# Patient Record
Sex: MALE | Race: WHITE | URBAN - METROPOLITAN AREA
[De-identification: names, ages, dates, MRNs, and addresses within clinical notes are randomized per-mention and may not be internally consistent; named-entity substitution may affect disease eponyms.]

---

## 2020-10-29 ENCOUNTER — OFFICE VISIT (OUTPATIENT)
Dept: FAMILY MEDICINE | Facility: CLINIC | Age: 25
End: 2020-10-29
Payer: COMMERCIAL

## 2020-10-29 ENCOUNTER — ANCILLARY PROCEDURE (OUTPATIENT)
Dept: GENERAL RADIOLOGY | Facility: CLINIC | Age: 25
End: 2020-10-29
Attending: FAMILY MEDICINE
Payer: COMMERCIAL

## 2020-10-29 VITALS
TEMPERATURE: 96.3 F | WEIGHT: 170 LBS | OXYGEN SATURATION: 97 % | HEIGHT: 73 IN | HEART RATE: 72 BPM | RESPIRATION RATE: 16 BRPM | DIASTOLIC BLOOD PRESSURE: 77 MMHG | SYSTOLIC BLOOD PRESSURE: 125 MMHG | BODY MASS INDEX: 22.53 KG/M2

## 2020-10-29 DIAGNOSIS — R06.09 DYSPNEA ON EXERTION: Primary | ICD-10-CM

## 2020-10-29 DIAGNOSIS — J45.990 EXERCISE-INDUCED ASTHMA: ICD-10-CM

## 2020-10-29 DIAGNOSIS — R06.09 DYSPNEA ON EXERTION: ICD-10-CM

## 2020-10-29 LAB
ERYTHROCYTE [DISTWIDTH] IN BLOOD BY AUTOMATED COUNT: 11.7 % (ref 10–15)
HCT VFR BLD AUTO: 42.1 % (ref 40–53)
HGB BLD-MCNC: 14.4 G/DL (ref 13.3–17.7)
MCH RBC QN AUTO: 30.1 PG (ref 26.5–33)
MCHC RBC AUTO-ENTMCNC: 34.2 G/DL (ref 31.5–36.5)
MCV RBC AUTO: 88 FL (ref 78–100)
PLATELET # BLD AUTO: 226 10E9/L (ref 150–450)
RBC # BLD AUTO: 4.78 10E12/L (ref 4.4–5.9)
WBC # BLD AUTO: 6.1 10E9/L (ref 4–11)

## 2020-10-29 PROCEDURE — 99203 OFFICE O/P NEW LOW 30 MIN: CPT | Performed by: FAMILY MEDICINE

## 2020-10-29 PROCEDURE — 85027 COMPLETE CBC AUTOMATED: CPT | Performed by: FAMILY MEDICINE

## 2020-10-29 PROCEDURE — 71046 X-RAY EXAM CHEST 2 VIEWS: CPT | Mod: FY | Performed by: RADIOLOGY

## 2020-10-29 RX ORDER — ALBUTEROL SULFATE 90 UG/1
2 AEROSOL, METERED RESPIRATORY (INHALATION) EVERY 6 HOURS PRN
Qty: 8 G | Refills: 4 | Status: SHIPPED | OUTPATIENT
Start: 2020-10-29

## 2020-10-29 ASSESSMENT — MIFFLIN-ST. JEOR: SCORE: 1814.99

## 2020-10-29 NOTE — LETTER
My Asthma Action Plan    Name: Hector Bentley   YOB: 1995  Date: 10/29/2020   My doctor: Kimberly Melendrez MD   My clinic: [facility]        My Rescue Medicine:   Albuterol inhaler (Proair/Ventolin/Proventil HFA)  2-4 puffs EVERY 4 HOURS as needed. Use a spacer if recommended by your provider.   My Asthma Severity:   Intermittent / Exercise Induced  Know your asthma triggers: cold air  exercise or sports          GREEN ZONE   Good Control    I feel good    No cough or wheeze    Can work, sleep and play without asthma symptoms       Take your asthma control medicine every day.     1. If exercise triggers your asthma, take your rescue medication    15 minutes before exercise or sports, and    During exercise if you have asthma symptoms  2. Spacer to use with inhaler: If you have a spacer, make sure to use it with your inhaler             YELLOW ZONE Getting Worse  I have ANY of these:    I do not feel good    Cough or wheeze    Chest feels tight    Wake up at night   1. Keep taking your Green Zone medications  2. Start taking your rescue medicine:    every 20 minutes for up to 1 hour. Then every 4 hours for 24-48 hours.  3. If you stay in the Yellow Zone for more than 12-24 hours, contact your doctor.  4. If you do not return to the Green Zone in 12-24 hours or you get worse, start taking your oral steroid medicine if prescribed by your provider.           RED ZONE Medical Alert - Get Help  I have ANY of these:    I feel awful    Medicine is not helping    Breathing getting harder    Trouble walking or talking    Nose opens wide to breathe       1. Take your rescue medicine NOW  2. If your provider has prescribed an oral steroid medicine, start taking it NOW  3. Call your doctor NOW  4. If you are still in the Red Zone after 20 minutes and you have not reached your doctor:    Take your rescue medicine again and    Call 911 or go to the emergency room right away    See your regular  doctor within 2 weeks of an Emergency Room or Urgent Care visit for follow-up treatment.          Annual Reminders:  Meet with Asthma Educator,  Flu Shot in the Fall, consider Pneumonia Vaccination for patients with asthma (aged 19 and older).    Pharmacy: [id number] - [place] [id number] [facility] AT [facility] [address]    Electronically signed by Kimberly Melendrez MD   Date: 10/29/20                    Asthma Triggers  How To Control Things That Make Your Asthma Worse    Triggers are things that make your asthma worse.  Look at the list below to help you find your triggers and   what you can do about them. You can help prevent asthma flare-ups by staying away from your triggers.      Trigger                                                          What you can do   Cigarette Smoke  Tobacco smoke can make asthma worse. Do not allow smoking in your home, car or around you.  Be sure no one smokes at a child s day care or school.  If you smoke, ask your health care provider for ways to help you quit.  Ask family members to quit too.  Ask your health care provider for a referral to Quit Plan to help you quit smoking, or call 5-716-127-PLAN.     Colds, Flu, Bronchitis  These are common triggers of asthma. Wash your hands often.  Don t touch your eyes, nose or mouth.  Get a flu shot every year.     Dust Mites  These are tiny bugs that live in cloth or carpet. They are too small to see. Wash sheets and blankets in hot water every week.   Encase pillows and mattress in dust mite proof covers.  Avoid having carpet if you can. If you have carpet, vacuum weekly.   Use a dust mask and HEPA vacuum.   Pollen and Outdoor Mold  Some people are allergic to trees, grass, or weed pollen, or molds. Try to keep your windows closed.  Limit time out doors when pollen count is high.   Ask you health care provider about taking medicine during allergy season.     Animal Dander  Some people are allergic to skin flakes,  urine or saliva from pets with fur or feathers. Keep pets with fur or feathers out of your home.    If you can t keep the pet outdoors, then keep the pet out of your bedroom.  Keep the bedroom door closed.  Keep pets off cloth furniture and away from stuffed toys.     Mice, Rats, and Cockroaches  Some people are allergic to the waste from these pests.   Cover food and garbage.  Clean up spills and food crumbs.  Store grease in the refrigerator.   Keep food out of the bedroom.   Indoor Mold  This can be a trigger if your home has high moisture. Fix leaking faucets, pipes, or other sources of water.   Clean moldy surfaces.  Dehumidify basement if it is damp and smelly.   Smoke, Strong Odors, and Sprays  These can reduce air quality. Stay away from strong odors and sprays, such as perfume, powder, hair spray, paints, smoke incense, paint, cleaning products, candles and new carpet.   Exercise or Sports  Some people with asthma have this trigger. Be active!  Ask your doctor about taking medicine before sports or exercise to prevent symptoms.    Warm up for 5-10 minutes before and after sports or exercise.     Other Triggers of Asthma  Cold air:  Cover your nose and mouth with a scarf.  Sometimes laughing or crying can be a trigger.  Some medicines and food can trigger asthma.

## 2020-10-29 NOTE — PROGRESS NOTES
Subjective     Hector Bentley is a 24 year old male who presents to clinic today for the following health issues:    HPI         Asthma Follow-Up    Was ACT completed today?  Yes    ACT Total Scores 10/29/2020   ACT TOTAL SCORE (Goal Greater than or Equal to 20) 21   In the past 12 months, how many times did you visit the emergency room for your asthma without being admitted to the hospital? 0   In the past 12 months, how many times were you hospitalized overnight because of your asthma? 0          How many days per week do you miss taking your asthma controller medication?  I do not have an asthma controller medication    Please describe any recent triggers for your asthma: exercise or sports    Have you had any Emergency Room Visits, Urgent Care Visits, or Hospital Admissions since your last office visit?  No      How many servings of fruits and vegetables do you eat daily?  2-3    On average, how many sweetened beverages do you drink each day (Examples: soda, juice, sweet tea, etc.  Do NOT count diet or artificially sweetened beverages)?   0    How many days per week do you exercise enough to make your heart beat faster? 3 or less    How many minutes a day do you exercise enough to make your heart beat faster? 60 or more    How many days per week do you miss taking your medication? 0  Plays hockey and tennis, bike and running. Reports having shortness of breath and feeling short winded after physical exertion. Symptoms started after graduating high school. Patient feels short winded a lot faster than his teammates.     He denies any wheezing. Denies palpitations. Denies any environmental allergies. Cold air combined with physical activity triggers the shortness of breath.   He has not tried any OTC medications. Has never been tested for asthma.       New Patient/Transfer of Care    Review of Systems   Constitutional, HEENT, cardiovascular, pulmonary, gi and gu systems are negative, except as otherwise noted.     "  Objective    /77   Pulse 72   Temp 96.3  F (35.7  C) (Tympanic)   Resp 16   Ht 1.854 m (6' 1\")   Wt 77.1 kg (170 lb)   SpO2 97%   BMI 22.43 kg/m    Body mass index is 22.43 kg/m .  Physical Exam   GENERAL: healthy, alert and no distress  EYES: Eyes grossly normal to inspection, PERRL and conjunctivae and sclerae normal  HENT: ear canals and TM's normal, nose and mouth without ulcers or lesions  NECK: no adenopathy, no asymmetry, masses, or scars and thyroid normal to palpation  RESP: lungs clear to auscultation - no rales, rhonchi or wheezes  CV: regular rate and rhythm, normal S1 S2, no S3 or S4, no murmur, click or rub, no peripheral edema and peripheral pulses strong  MS: no gross musculoskeletal defects noted, no edema  PSYCH: mentation appears normal, affect normal/bright    Results for orders placed or performed in visit on 10/29/20   XR Chest 2 Views     Status: None    Narrative    CHEST TWO VIEWS 10/29/2020 8:08 AM     HISTORY: Dyspnea on exertion.    COMPARISON: None.       Impression    IMPRESSION:  There are no acute infiltrates. The cardiac silhouette is  not enlarged. Pulmonary vasculature is unremarkable.     SG MILLER MD   Results for orders placed or performed in visit on 10/29/20   CBC with platelets     Status: None   Result Value Ref Range    WBC 6.1 4.0 - 11.0 10e9/L    RBC Count 4.78 4.4 - 5.9 10e12/L    Hemoglobin 14.4 13.3 - 17.7 g/dL    Hematocrit 42.1 40.0 - 53.0 %    MCV 88 78 - 100 fl    MCH 30.1 26.5 - 33.0 pg    MCHC 34.2 31.5 - 36.5 g/dL    RDW 11.7 10.0 - 15.0 %    Platelet Count 226 150 - 450 10e9/L           Assessment & Plan     Dyspnea on exertion  Assessment: 24-year-old with no significant past medical history who presents to the clinic for dyspnea on exertion especially when he exercises in cold air.  Patient reports that symptoms have been ongoing since graduating high school.  He has never been evaluated for asthma.  Due to the pandemic we will not be able " to do any spirometry testing today.  Other differentials includes scoliosis and anemia.  Chest x-ray and CBC were ordered today.  He was given a trial of an albuterol inhaler.  Advised to return to clinic for a inhaled corticosteroid if symptoms fails to improve with the use of albuterol inhaler.  Patient verbalized understanding.  He declined immunizations today.  Plan:  - XR Chest 2 Views; Future  - CBC with platelets    Exercise-induced asthma  - albuterol (PROAIR HFA/PROVENTIL HFA/VENTOLIN HFA) 108 (90 Base) MCG/ACT inhaler; Inhale 2 puffs into the lungs every 6 hours as needed for shortness of breath / dyspnea or wheezing        Return in about 2 weeks (around 11/12/2020) for Follow-up visit-  if symptoms fails to improve.  -Patient needs to establish care with PCP.      Kimberly Melendrez MD  [facility]

## 2020-10-29 NOTE — PATIENT INSTRUCTIONS
Patient Education     Proair HFA Inhaler  Medicine: Albuterol (al-BYOO-ter-ahl)  What it does  Works quickly to relax your airways and make breathing easier. The medicine usually lasts 3 to 4 hours. Use when you need fast relief.  Test spray (priming)  Prime inhaler before first use or if not used for 2 weeks or more. Shake the inhaler and spray 3 times away from face.  How to use this inhaler  1. Wash and dry your hands well.  2. Remove the mouthpiece cover. Check for loose parts inside the mouthpiece.  3. Sit up straight or stand up.  4. Shake inhaler well before each spray.  5. Fully exhale (breathe out) through mouth. Hold the inhaler so the mouthpiece is at the bottom and the canister is sticking straight up.  6. Place the mouthpiece between your lips. Make sure that your tongue is flat under the mouthpiece and does not block the opening.  7. Seal your lips around the mouthpiece.  8. Push the canister all the way down as you slowly take a deep breath through your mouth for 5 seconds.  9. Hold your breath for 10 seconds. If you cannot hold your breath for 10 seconds, then hold it for as long as you can.  10. If you need another dose, wait one minute and repeat steps 4 to 9.  11. Replace the cover after each use. Store in a cool, dry place.  Cleaning  Wash one time each week. Remove canister and wash mouthpiece with warm water. Do not get the canister wet. Let air-dry overnight. Put inhaler back together and spray two times.  If not cleaned, the inhaler may not work.  How to tell if the inhaler is empty  Each inhaler contains 200 puffs. The inhaler is empty when the dose counter reads 0. The numbers will turn red when you have 20 doses left to remind you to get a refill.  If you have questions about the use of your inhaler, please ask your pharmacist or provider.  For more information and video demos, go to [email or web address].  For informational purposes only. Not to replace the advice of your health care  provider.  Copyright   2013 [employer] [facility]. All rights reserved. [employer] [id number] - REV 02/16.  For informational purposes only. Not to replace the advice of your health care provider.  Copyright   2018 [employer]. All rights reserved.           Patient Education     Bronchospasm (Adult)    Bronchospasm occurs when the airways (bronchial tubes) go into spasm and contract. This makes it hard to breathe and causes wheezing (a high-pitched whistling sound). Bronchospasm can also cause frequent coughing without wheezing.  Bronchospasm is due to irritation, inflammation, or allergic reaction of the airways. People with asthma get bronchospasm. However, not everyone with bronchospasm has asthma.  Being exposed to harmful fumes, a recent case of bronchitis, exercise, or a flare-up of chronic obstructive pulmonary disease (COPD) may cause the airways to spasm. An episode of bronchospasm may last 7 to 14 days. Medicine may be prescribed to relax the airways and prevent wheezing. Antibiotics will be prescribed only if your healthcare provider thinks there is a bacterial infection. Antibiotics do not help a viral infection.  Home care    Drink lots of water or other fluids (at least 10 glasses a day) during an attack. This will loosen lung secretions and make it easier to breathe. If you have heart or kidney disease, check with your doctor before you drink extra fluids.    Take prescribed medicine exactly at the times advised. If you take an inhaled medicine to help with breathing, don't use it more than once every 4 hours, unless told to do so. If prescribed an antibiotic or prednisone, take all of the medicine, even if you are feeling better after a few days.    Don't smoke. Also avoid being exposed to secondhand smoke.    If you were given an inhaler, use it exactly as directed. If you need to use it more often than prescribed, your condition may be getting worse. Contact your healthcare  provider.  Follow-up care  Follow up with your healthcare provider, or as advised.  If you are age 65 or older, have a chronic lung disease or condition that affects your immune system, or you smoke, ask your healthcare provider about getting a pneumococcal vaccine, as well as a yearly flu shot (influenza vaccine).  When to seek medical advice  Call your healthcare provider right away if any of these occur:    You need to use your inhalers more often than usual    Fever of 100.4 F (38 C) or higher, or as directed by your healthcare provider    Cough that brings up lots of dark-colored sputum (mucus)    You don't get better within 24 hours  Call 911  Call 911 if any of these occur:    Coughing up bloody sputum (mucus)    Chest pain with each breath    Increased wheezing or shortness of breath  [employer] last reviewed this educational content on 6/1/2018 2000-2020 [employer]. [address] [place] [id number]. All rights reserved. This information is not intended as a substitute for professional medical care. Always follow your healthcare professional's instructions.

## 2020-10-30 ASSESSMENT — ASTHMA QUESTIONNAIRES: ACT_TOTALSCORE: 21

## 2021-01-15 ENCOUNTER — HEALTH MAINTENANCE LETTER (OUTPATIENT)
Age: 26
End: 2021-01-15

## 2021-02-22 ENCOUNTER — OFFICE VISIT (OUTPATIENT)
Dept: FAMILY MEDICINE | Facility: CLINIC | Age: 26
End: 2021-02-22
Payer: COMMERCIAL

## 2021-02-22 VITALS
SYSTOLIC BLOOD PRESSURE: 123 MMHG | RESPIRATION RATE: 16 BRPM | WEIGHT: 169.4 LBS | TEMPERATURE: 98.7 F | OXYGEN SATURATION: 98 % | DIASTOLIC BLOOD PRESSURE: 83 MMHG | BODY MASS INDEX: 22.94 KG/M2 | HEIGHT: 72 IN | HEART RATE: 58 BPM

## 2021-02-22 DIAGNOSIS — R20.9 COLD EXTREMITIES: Primary | ICD-10-CM

## 2021-02-22 LAB
BASOPHILS # BLD AUTO: 0 10E9/L (ref 0–0.2)
BASOPHILS NFR BLD AUTO: 0.2 %
DIFFERENTIAL METHOD BLD: NORMAL
EOSINOPHIL # BLD AUTO: 0.1 10E9/L (ref 0–0.7)
EOSINOPHIL NFR BLD AUTO: 1.4 %
ERYTHROCYTE [DISTWIDTH] IN BLOOD BY AUTOMATED COUNT: 12.7 % (ref 10–15)
HCT VFR BLD AUTO: 45.2 % (ref 40–53)
HGB BLD-MCNC: 15.6 G/DL (ref 13.3–17.7)
LYMPHOCYTES # BLD AUTO: 1.4 10E9/L (ref 0.8–5.3)
LYMPHOCYTES NFR BLD AUTO: 33.1 %
MCH RBC QN AUTO: 30.5 PG (ref 26.5–33)
MCHC RBC AUTO-ENTMCNC: 34.5 G/DL (ref 31.5–36.5)
MCV RBC AUTO: 88 FL (ref 78–100)
MONOCYTES # BLD AUTO: 0.3 10E9/L (ref 0–1.3)
MONOCYTES NFR BLD AUTO: 8.1 %
NEUTROPHILS # BLD AUTO: 2.4 10E9/L (ref 1.6–8.3)
NEUTROPHILS NFR BLD AUTO: 57.2 %
PLATELET # BLD AUTO: 195 10E9/L (ref 150–450)
RBC # BLD AUTO: 5.12 10E12/L (ref 4.4–5.9)
WBC # BLD AUTO: 4.2 10E9/L (ref 4–11)

## 2021-02-22 PROCEDURE — 99213 OFFICE O/P EST LOW 20 MIN: CPT | Performed by: PHYSICIAN ASSISTANT

## 2021-02-22 PROCEDURE — 36415 COLL VENOUS BLD VENIPUNCTURE: CPT | Performed by: PHYSICIAN ASSISTANT

## 2021-02-22 PROCEDURE — 84443 ASSAY THYROID STIM HORMONE: CPT | Performed by: PHYSICIAN ASSISTANT

## 2021-02-22 PROCEDURE — 85025 COMPLETE CBC W/AUTO DIFF WBC: CPT | Performed by: PHYSICIAN ASSISTANT

## 2021-02-22 PROCEDURE — 85379 FIBRIN DEGRADATION QUANT: CPT | Performed by: PHYSICIAN ASSISTANT

## 2021-02-22 ASSESSMENT — MIFFLIN-ST. JEOR: SCORE: 1791.39

## 2021-02-22 NOTE — PROGRESS NOTES
Assessment & Plan     Cold extremities  Labs look ok.  This appear to be chilblains and encouraged to keep area warm and if symptoms persist or worsen, may look to vascular referral  - CBC with platelets differential  - D dimer, quantitative  - TSH with free T4 reflex                 Return in about 2 weeks (around 3/8/2021) for If symptoms persist or worsen.    LAZARUS Tello [facility]    Venecia Young is a 25 year old who presents for the following health issues     HPI       Patient states that he played ice hockey last Saturday (9 days ago) and since then his toes on both feet feel cold and wet. Hector is unsure if he may have had some frostbite or not.  No COVID symptoms        Review of Systems   Constitutional, HEENT, cardiovascular, pulmonary, gi and gu systems are negative, except as otherwise noted.      Objective    /83   Pulse 58   Temp 98.7  F (37.1  C) (Tympanic)   Resp 16   Ht 1.829 m (6')   Wt 76.8 kg (169 lb 6.4 oz)   SpO2 98%   BMI 22.97 kg/m    Body mass index is 22.97 kg/m .  Physical Exam   GENERAL: alert and no distress  EYES: Eyes grossly normal to inspection  RESP: lungs clear to auscultation - no rales, rhonchi or wheezes  CV: regular rate and rhythm, normal S1 S2, no S3 or S4, no murmur, click or rub, no peripheral edema and peripheral pulses strong  MS: skin blanches and returns briskly in feet and toes, normal sensation.  Pedal pulses are palpable and normal    Results for orders placed or performed in visit on 02/22/21   CBC with platelets differential     Status: None   Result Value Ref Range    WBC 4.2 4.0 - 11.0 10e9/L    RBC Count 5.12 4.4 - 5.9 10e12/L    Hemoglobin 15.6 13.3 - 17.7 g/dL    Hematocrit 45.2 40.0 - 53.0 %    MCV 88 78 - 100 fl    MCH 30.5 26.5 - 33.0 pg    MCHC 34.5 31.5 - 36.5 g/dL    RDW 12.7 10.0 - 15.0 %    Platelet Count 195 150 - 450 10e9/L    % Neutrophils 57.2 %    % Lymphocytes 33.1 %    % Monocytes 8.1  %    % Eosinophils 1.4 %    % Basophils 0.2 %    Absolute Neutrophil 2.4 1.6 - 8.3 10e9/L    Absolute Lymphocytes 1.4 0.8 - 5.3 10e9/L    Absolute Monocytes 0.3 0.0 - 1.3 10e9/L    Absolute Eosinophils 0.1 0.0 - 0.7 10e9/L    Absolute Basophils 0.0 0.0 - 0.2 10e9/L    Diff Method Automated Method    D dimer, quantitative     Status: None   Result Value Ref Range    D Dimer 0.3 0.0 - 0.50 ug/ml FEU   TSH with free T4 reflex     Status: None   Result Value Ref Range    TSH 2.23 0.40 - 4.00 mU/L

## 2021-02-22 NOTE — NURSING NOTE
Chief Complaint   Patient presents with     Toe Problem     Pt feels that his toes have stayed cold and wet feeling since last week when playing ice hockey     /83   Pulse 58   Temp 98.7  F (37.1  C) (Tympanic)   Resp 16   Ht 1.829 m (6')   Wt 76.8 kg (169 lb 6.4 oz)   SpO2 98%   BMI 22.97 kg/m   Estimated body mass index is 22.97 kg/m  as calculated from the following:    Height as of this encounter: 1.829 m (6').    Weight as of this encounter: 76.8 kg (169 lb 6.4 oz).  Medication Reconciliation: complete        Health Maintenance Due Pending Provider Review:  NONE    n/a    Ana Roche MA  [facility]  [phone]

## 2021-02-23 LAB
D DIMER PPP FEU-MCNC: 0.3 UG/ML FEU (ref 0–0.5)
TSH SERPL DL<=0.005 MIU/L-ACNC: 2.23 MU/L (ref 0.4–4)

## 2021-02-26 NOTE — RESULT ENCOUNTER NOTE
Dear Hector    Your test results are attached, feel free to contact me via [email or web address]     Everything looks good on your labs.  There is a condition called chilblains, where you get this cold sensation along with some color change of extremities after exposure to cold.  The good news is that is self resolves, the key it to keep area warm to avoid further exposure.  Let me know if anything changes.    Eleuterio Landon PA-C

## 2021-09-09 ENCOUNTER — OFFICE VISIT (OUTPATIENT)
Dept: FAMILY MEDICINE | Facility: CLINIC | Age: 26
End: 2021-09-09
Payer: COMMERCIAL

## 2021-09-09 VITALS
DIASTOLIC BLOOD PRESSURE: 77 MMHG | SYSTOLIC BLOOD PRESSURE: 123 MMHG | HEART RATE: 72 BPM | OXYGEN SATURATION: 96 % | RESPIRATION RATE: 16 BRPM | TEMPERATURE: 97.7 F | WEIGHT: 168 LBS | BODY MASS INDEX: 22.78 KG/M2

## 2021-09-09 DIAGNOSIS — S83.206A TEAR OF MENISCUS OF RIGHT KNEE AS CURRENT INJURY, UNSPECIFIED MENISCUS, UNSPECIFIED TEAR TYPE, INITIAL ENCOUNTER: ICD-10-CM

## 2021-09-09 DIAGNOSIS — Z01.818 PREOP GENERAL PHYSICAL EXAM: Primary | ICD-10-CM

## 2021-09-09 PROCEDURE — 99213 OFFICE O/P EST LOW 20 MIN: CPT | Performed by: NURSE PRACTITIONER

## 2021-09-09 ASSESSMENT — ASTHMA QUESTIONNAIRES
QUESTION_1 LAST FOUR WEEKS HOW MUCH OF THE TIME DID YOUR ASTHMA KEEP YOU FROM GETTING AS MUCH DONE AT WORK, SCHOOL OR AT HOME: NONE OF THE TIME
QUESTION_5 LAST FOUR WEEKS HOW WOULD YOU RATE YOUR ASTHMA CONTROL: COMPLETELY CONTROLLED
QUESTION_3 LAST FOUR WEEKS HOW OFTEN DID YOUR ASTHMA SYMPTOMS (WHEEZING, COUGHING, SHORTNESS OF BREATH, CHEST TIGHTNESS OR PAIN) WAKE YOU UP AT NIGHT OR EARLIER THAN USUAL IN THE MORNING: NOT AT ALL
QUESTION_4 LAST FOUR WEEKS HOW OFTEN HAVE YOU USED YOUR RESCUE INHALER OR NEBULIZER MEDICATION (SUCH AS ALBUTEROL): NOT AT ALL
QUESTION_2 LAST FOUR WEEKS HOW OFTEN HAVE YOU HAD SHORTNESS OF BREATH: NOT AT ALL
ACT_TOTALSCORE: 25

## 2021-09-09 NOTE — PROGRESS NOTES
[address], [address]  [facility] [address]  Phone: [phone]  Primary Provider: [facility]  Pre-op Performing Provider: XIOMARA GONZALEZ      PREOPERATIVE EVALUATION:  Today's date: 9/9/2021    Hector Bentley is a 25 year old male who presents for a preoperative evaluation.    Surgical Information:  Surgery/Procedure: meniscus repair right  Surgery Location: [facility]  Surgeon: Dr. Barrios  Surgery Date: 09/16/2021  Time of Surgery: TBD  Where patient plans to recover: At home with family  Fax number for surgical facility: [phone]    Type of Anesthesia Anticipated: General    Assessment & Plan     The proposed surgical procedure is considered INTERMEDIATE risk.    Problem List Items Addressed This Visit     None      Visit Diagnoses     Preop general physical exam    -  Primary    Tear of meniscus of right knee as current injury, unspecified meniscus, unspecified tear type, initial encounter                   Risks and Recommendations:  The patient has the following additional risks and recommendations for perioperative complications:   - No identified additional risk factors other than previously addressed    Medication Instructions:  Patient is on no chronic medications    RECOMMENDATION:  APPROVAL GIVEN to proceed with proposed procedure, without further diagnostic evaluation.        Subjective     HPI related to upcoming procedure: going for meniscus repair  Has been feeling well without any complaints   Had covid vaccine       Preop Questions 9/9/2021   1. Have you ever had a heart attack or stroke? No   2. Have you ever had surgery on your heart or blood vessels, such as a stent placement, a coronary artery bypass, or surgery on an artery in your head, neck, heart, or legs? No   3. Do you have chest pain with activity? No   4. Do you have a history of  heart failure? No   5. Do you currently have a cold, bronchitis or  symptoms of other infection? No   6. Do you have a cough, shortness of breath, or wheezing? No   7. Do you or anyone in your family have previous history of blood clots? No   8. Do you or does anyone in your family have a serious bleeding problem such as prolonged bleeding following surgeries or cuts? No   9. Have you ever had problems with anemia or been told to take iron pills? No   10. Have you had any abnormal blood loss such as black, tarry or bloody stools? No   11. Have you ever had a blood transfusion? No   12. Are you willing to have a blood transfusion if it is medically needed before, during, or after your surgery? Yes   13. Have you or any of your relatives ever had problems with anesthesia? No   14. Do you have sleep apnea, excessive snoring or daytime drowsiness? No   15. Do you have any artifical heart valves or other implanted medical devices like a pacemaker, defibrillator, or continuous glucose monitor? No   16. Do you have artificial joints? No   17. Are you allergic to latex? No     Health Care Directive:  Patient does not have a Health Care Directive or Living Will: Discussed advance care planning with patient; however, patient declined at this time.    Preoperative Review of :   reviewed - no record of controlled substances prescribed.      Status of Chronic Conditions:  See problem list for active medical problems.  Problems all longstanding and stable, except as noted/documented.  See ROS for pertinent symptoms related to these conditions.      Review of Systems  Constitutional, neuro, ENT, endocrine, pulmonary, cardiac, gastrointestinal, genitourinary, musculoskeletal, integument and psychiatric systems are negative, except as otherwise noted.    There are no problems to display for this patient.     Past Medical History:   Diagnosis Date     ACL tear      Insomnia      Past Surgical History:   Procedure Laterality Date     ARTHROSCOPIC RECONSTRUCTION ANTERIOR CRUCIATE LIGAMENT Right  12/23/2015    Procedure: ARTHROSCOPIC RECONSTRUCTION ANTERIOR CRUCIATE LIGAMENT;  Surgeon: Santiago Foster MD;  [facility]     ARTHROSCOPY KNEE WITH LATERAL MENISCECTOMY Right 12/23/2015    Procedure: ARTHROSCOPY KNEE WITH LATERAL MENISCECTOMY;  Surgeon: Santiago Foster MD;  [facility]     ORTHOPEDIC SURGERY Right 2008    small finger     wisdom teeth removal       Current Outpatient Medications   Medication Sig Dispense Refill     albuterol (PROAIR HFA/PROVENTIL HFA/VENTOLIN HFA) 108 (90 Base) MCG/ACT inhaler Inhale 2 puffs into the lungs every 6 hours as needed for shortness of breath / dyspnea or wheezing 8 g 4     MELATONIN PO Take 5 mg by mouth       NO ACTIVE MEDICATIONS          Allergies   Allergen Reactions     Dairy Products [Milk Protein Extract]      Lactose intolerant        Social History     Tobacco Use     Smoking status: Never Smoker     Smokeless tobacco: Never Used   Substance Use Topics     Alcohol use: Yes     Comment: Social      History reviewed. No pertinent family history.  History   Drug Use No         Objective     /77 (BP Location: Left arm, Cuff Size: Adult Regular)   Pulse 72   Temp 97.7  F (36.5  C) (Tympanic)   Resp 16   Wt 76.2 kg (168 lb)   SpO2 96%   BMI 22.78 kg/m      Physical Exam    GENERAL APPEARANCE: healthy, alert and no distress     EYES: EOMI     RESP: lungs clear to auscultation - no rales, rhonchi or wheezes     CV: regular rates and rhythm, normal S1 S2, no S3 or S4 and no murmur, click or rub     MS: extremities normal- no gross deformities noted, no evidence of inflammation in joints, FROM in all extremities.     SKIN: no suspicious lesions or rashes     NEURO: Normal strength and tone, sensory exam grossly normal, mentation intact and speech normal     PSYCH: mentation appears normal. and affect normal/bright    Recent Labs   Lab Test 02/22/21  1712 10/29/20  0743   HGB 15.6 14.4    226        Diagnostics:  No labs were  ordered during this visit.   No EKG required for low risk surgery (cataract, skin procedure, breast biopsy, etc).    Revised Cardiac Risk Index (RCRI):  The patient has the following serious cardiovascular risks for perioperative complications:   - No serious cardiac risks = 0 points     RCRI Interpretation: 0 points: Class I (very low risk - 0.4% complication rate)           Signed Electronically by: KALIA Carter CNP  Copy of this evaluation report is provided to requesting physician.

## 2021-09-09 NOTE — PATIENT INSTRUCTIONS
Preparing for Your Surgery  Getting started  A nurse will call you to review your health history and instructions. They will give you an arrival time based on your scheduled surgery time.  Please be ready to share the following:    Your doctor's clinic name and phone number    Your medical, surgical and anesthesia history    A list of allergies and sensitivities    A list of medicines, including herbal treatments and over-the-counter drugs    Whether the patient has a legal guardian (ask how to send us the papers in advance)  If you have a child who's having surgery, please ask for a copy of Preparing for Your Child's Surgery.    Preparing for surgery    Within 30 days of surgery: Have a pre-op exam (sometimes called an H&P, or History and Physical). This can be done at a clinic or pre-operative center.  ? If you're having a , you may not need this exam. Talk to your care team    At your pre-op exam, talk to your care team about all medicines you take. If you need to stop any medicines before surgery, ask when to start taking them again.  ? We do this for your safety. Many medicines can make you bleed too much during surgery. Some change how well surgery (anesthesia) drugs work.    Call your insurance company to let them know you're having surgery. (If you don't have insurance, call [phone].)    Call your clinic if there's any change in your health. This includes signs of a cold or flu (sore throat, runny nose, cough, rash, fever). It also includes a scrape or scratch near the surgery site.    If you have questions on the day of surgery, call your hospital or surgery center.  Eating and drinking guidelines  For your safety: Unless your surgeon tells you otherwise, follow the guidelines below.    Eat and drink as usual until 8 hours before surgery. After that, no food or milk.    Drink clear liquids until 2 hours before surgery. These are liquids you can see through, like water, Gatorade and Propel  Water. You may also have black coffee and tea (no cream or milk).    Nothing by mouth within 2 hours of surgery. This includes gum, candy and breath mints.    If you drink, stop drinking alcohol the night before surgery.    If your care team tells you to take medicine on the morning of surgery, it's okay to take it with a sip of water.  Preventing infection    Shower or bathe the night before and morning of your surgery. Follow the instructions your clinic gave you. (If no instructions, use regular soap.)    Don't shave or clip hair near your surgery site. We'll remove the hair if needed.    Don't smoke or vape the morning of surgery. You may chew nicotine gum up to 2 hours before surgery. A nicotine patch is okay.  ? Note: Some surgeries require you to completely quit smoking and nicotine. Check with your surgeon.    Your care team will make every effort to keep you safe from infection. We will:  ? Clean our hands often with soap and water (or an alcohol-based hand rub).  ? Clean the skin at your surgery site with a special soap that kills germs.  ? Give you a special gown to keep you warm. (Cold raises the risk of infection.)  ? Wear special hair covers, masks, gowns and gloves during surgery.  ? Give antibiotic medicine, if prescribed. Not all surgeries need antibiotics.  What to bring on the day of surgery    Photo ID and insurance card    Copy of your health care directive, if you have one    Glasses and hearing aides (bring cases)  ? You can't wear contacts during surgery    Inhaler and eye drops, if you use them (tell us about these when you arrive)    CPAP machine or breathing device, if you use them    A few personal items, if spending the night    If you have . . .  ? A pacemaker or ICD (cardiac defibrillator): Bring the ID card.  ? An implanted stimulator: Bring the remote control.  ? A legal guardian: Bring a copy of the certified (court-stamped) guardianship papers.  Please remove any jewelry, including  body piercings. Leave jewelry and other valuables at home.  If you're going home the day of surgery  Important: If you don't follow the rules below, we must cancel your surgery.     Arrange for someone to drive you home after surgery. You may not drive, take a taxi or take public transportation by yourself (unless you'll have local anesthesia only).    Arrange for a responsible adult to stay with you overnight. If you don't, we may keep you in the hospital overnight, and you may need to pay the costs yourself.  Questions?   If you have any questions for your care team, list them here: _________________________________________________________________________________________________________________________________________________________________________________________________________________________________________________________________________________________________________________________  For informational purposes only. Not to replace the advice of your health care provider. Copyright   2003, 2019 [employer]. All rights reserved. Clinically reviewed by Julisa White MD. [employer] 680885 - REV 4/20.    Before Your Procedure or Hospital Admission  Testing for COVID-19 (Coronavirus)  Thank you for choosing [facility] for your health care needs. The COVID-19 pandemic is a very challenging time for everyone.   Our goal is to keep you and our team here at [facility] safe and healthy. We've taken many steps to make this happen. For example:    We test and screen our staff, care teams and patients for COVID-19.    Everyone at [facility] must wear a mask and stay 6 feet apart.    We are limiting hospital and clinic visitors.  Before you come in  All patients must get tested for COVID-19. Your test needs to happen 3 to 4 days before you check in to the hospital or surgery site.   A clinic scheduler will call about a week in advance to set up a testing time at one of our labs. We'll take  "a swab of your nose or throat.  Note: If you go to a clinic or pharmacy like [facility] or [facility] for your test, make sure you get a test inside the nose. Tests inside the nose are:    A naso-pharyngeal (NP) RT-PCR test    An anterior nares RT-PCR test  Do NOT get a \"rapid\" test or a saliva (spit) test.  After the test, please stay at home and stay out of contact with other people. It will be harder for you to recover if you get COVID-19 before your treatment.  Please follow all current safety guidelines, including:    Limit trips outside your home.    Limit the number of people you see.    Always wear a mask outside your home.    Use social distancing. Stay 6 feet away from others whenever you can.    Wash your hands often.  If your test shows you have COVID-19  If your test is positive, we'll let you know. A positive test means that you have the virus.   We'll probably have to postpone your admission, surgery or procedure. Your doctor will discuss this with you. After that, we'll let you know what to do and when you can re-schedule.   We may need to cancel your treatment on short notice for other reasons, too.  If your test shows you DON'T have COVID-19  Even if your test is negative, you can still get COVID-19. It's rare but, sometimes, the test result is wrong. You could also catch the virus after taking the test.   There's a very small chance that you could catch COVID-19 in the hospital or surgery center. [facility] has taken many steps to prevent this from happening.   Day of your surgery or procedure    Please come wearing a face covering that covers both your nose and mouth.    When you arrive, we'll ask you some questions to find out if you have any signs or symptoms of COVID-19.    Ask your care team if you can have visitors. All visitors must wear face coverings and will be screened for signs of COVID-19.  ? Even if no visitors are allowed, you can still have with you:    Your legal guardian or legal " "decision maker    A parent and one other visitor, if you are younger than 18 years old    A partner and a , if you are in labor  ? We might need to teach you about taking care of yourself after surgery. If so, a visitor can come into the hospital to learn about it, too.  ? The rules for visitors change often, depending on how much the virus is spreading. To learn more, see Visiting a Loved One in the Hospital during the COVID-19 Outbreak.  Please call your care team, hospital or surgery center if you have any questions. We thank you for your understanding and for choosing [facility] for your care.   Questions and Answers  Does it matter where I get tested for COVID-19?  Yes. We urge you to get tested at one of our [facility] COVID-19 testing sites. We process these tests in our lab and can get the results quickly. Your [facility] care team needs to get your results before you check in.  What should I do if I can't get tested at [facility]?  You can get tested somewhere else, but you'll need to take these extra steps:   1. Contact your family doctor or clinic to arrange your test.  2. Take the test within 4 days of your surgery or procedure. We can't accept tests older than 4 days.  3. Make sure you're getting a test inside the nose. Tests inside the nose are:  ? A naso-pharyngeal (NP) RT-PCR test  ? An anterior nares RT-PCR test  Many pharmacies use \"rapid\" tests or saliva (spit) tests. We do NOT accept those tests before surgery or procedures. Tests from inside the nose are the most accurate tests.  1. Make sure your doctor or clinic faxes your results to [facility] at [phone].  If we don't get your results in time, we may have to delay or cancel your treatment.  For informational purposes only. Not to replace the advice of your health care provider. Copyright   2020 [facility]. All rights reserved. Clinically reviewed by Infection Prevention and the " [facility] COVID-19 Clinical Team. [employer] 649026 - Rev 08/18/21.

## 2021-09-10 ASSESSMENT — ASTHMA QUESTIONNAIRES: ACT_TOTALSCORE: 25

## 2021-10-24 ENCOUNTER — HEALTH MAINTENANCE LETTER (OUTPATIENT)
Age: 26
End: 2021-10-24

## 2022-02-13 ENCOUNTER — HEALTH MAINTENANCE LETTER (OUTPATIENT)
Age: 27
End: 2022-02-13

## 2022-10-15 ENCOUNTER — HEALTH MAINTENANCE LETTER (OUTPATIENT)
Age: 27
End: 2022-10-15

## 2023-03-26 ENCOUNTER — HEALTH MAINTENANCE LETTER (OUTPATIENT)
Age: 28
End: 2023-03-26

## 2024-05-26 ENCOUNTER — HEALTH MAINTENANCE LETTER (OUTPATIENT)
Age: 29
End: 2024-05-26